# Patient Record
Sex: FEMALE | Race: WHITE | NOT HISPANIC OR LATINO | Employment: PART TIME | URBAN - METROPOLITAN AREA
[De-identification: names, ages, dates, MRNs, and addresses within clinical notes are randomized per-mention and may not be internally consistent; named-entity substitution may affect disease eponyms.]

---

## 2019-06-08 ENCOUNTER — HOSPITAL ENCOUNTER (EMERGENCY)
Facility: HOSPITAL | Age: 49
Discharge: HOME | End: 2019-06-08
Attending: EMERGENCY MEDICINE | Admitting: EMERGENCY MEDICINE

## 2019-06-08 ENCOUNTER — APPOINTMENT (OUTPATIENT)
Dept: CT IMAGING | Facility: HOSPITAL | Age: 49
End: 2019-06-08

## 2019-06-08 VITALS
SYSTOLIC BLOOD PRESSURE: 122 MMHG | HEIGHT: 65 IN | BODY MASS INDEX: 34.16 KG/M2 | TEMPERATURE: 98.1 F | WEIGHT: 205 LBS | HEART RATE: 86 BPM | DIASTOLIC BLOOD PRESSURE: 60 MMHG | OXYGEN SATURATION: 97 % | RESPIRATION RATE: 18 BRPM

## 2019-06-08 DIAGNOSIS — N39.0 URINARY TRACT INFECTION WITH HEMATURIA, SITE UNSPECIFIED: Primary | ICD-10-CM

## 2019-06-08 DIAGNOSIS — R10.9 RIGHT FLANK PAIN: ICD-10-CM

## 2019-06-08 DIAGNOSIS — R31.9 URINARY TRACT INFECTION WITH HEMATURIA, SITE UNSPECIFIED: Primary | ICD-10-CM

## 2019-06-08 LAB
ALBUMIN SERPL-MCNC: 3.6 G/DL (ref 3.5–5.2)
ALBUMIN/GLOB SERPL: 0.9 G/DL
ALP SERPL-CCNC: 101 U/L (ref 39–117)
ALT SERPL W P-5'-P-CCNC: 14 U/L (ref 1–33)
ANION GAP SERPL CALCULATED.3IONS-SCNC: 14 MMOL/L
AST SERPL-CCNC: 16 U/L (ref 1–32)
B-HCG UR QL: NEGATIVE
BACTERIA UR QL AUTO: ABNORMAL /HPF
BASOPHILS # BLD AUTO: 0.04 10*3/MM3 (ref 0–0.2)
BASOPHILS NFR BLD AUTO: 0.2 % (ref 0–1.5)
BILIRUB SERPL-MCNC: 0.5 MG/DL (ref 0.2–1.2)
BILIRUB UR QL STRIP: ABNORMAL
BUN BLD-MCNC: 12 MG/DL (ref 6–20)
BUN/CREAT SERPL: 12.1 (ref 7–25)
CALCIUM SPEC-SCNC: 9.5 MG/DL (ref 8.6–10.5)
CHLORIDE SERPL-SCNC: 100 MMOL/L (ref 98–107)
CLARITY UR: ABNORMAL
CO2 SERPL-SCNC: 22 MMOL/L (ref 22–29)
COLOR UR: ABNORMAL
CREAT BLD-MCNC: 0.99 MG/DL (ref 0.57–1)
D-LACTATE SERPL-SCNC: 0.8 MMOL/L (ref 0.5–2)
DEPRECATED RDW RBC AUTO: 47.3 FL (ref 37–54)
EOSINOPHIL # BLD AUTO: 0.02 10*3/MM3 (ref 0–0.4)
EOSINOPHIL NFR BLD AUTO: 0.1 % (ref 0.3–6.2)
ERYTHROCYTE [DISTWIDTH] IN BLOOD BY AUTOMATED COUNT: 18.7 % (ref 12.3–15.4)
GFR SERPL CREATININE-BSD FRML MDRD: 59 ML/MIN/1.73
GLOBULIN UR ELPH-MCNC: 4.1 GM/DL
GLUCOSE BLD-MCNC: 129 MG/DL (ref 65–99)
GLUCOSE UR STRIP-MCNC: NEGATIVE MG/DL
HCT VFR BLD AUTO: 29.2 % (ref 34–46.6)
HGB BLD-MCNC: 8.6 G/DL (ref 12–15.9)
HGB UR QL STRIP.AUTO: ABNORMAL
HOLD SPECIMEN: NORMAL
HOLD SPECIMEN: NORMAL
HYALINE CASTS UR QL AUTO: ABNORMAL /LPF
IMM GRANULOCYTES # BLD AUTO: 0.07 10*3/MM3 (ref 0–0.05)
IMM GRANULOCYTES NFR BLD AUTO: 0.4 % (ref 0–0.5)
KETONES UR QL STRIP: NEGATIVE
LEUKOCYTE ESTERASE UR QL STRIP.AUTO: ABNORMAL
LIPASE SERPL-CCNC: 17 U/L (ref 13–60)
LYMPHOCYTES # BLD AUTO: 2.35 10*3/MM3 (ref 0.7–3.1)
LYMPHOCYTES NFR BLD AUTO: 14 % (ref 19.6–45.3)
MCH RBC QN AUTO: 21.2 PG (ref 26.6–33)
MCHC RBC AUTO-ENTMCNC: 29.5 G/DL (ref 31.5–35.7)
MCV RBC AUTO: 72.1 FL (ref 79–97)
MONOCYTES # BLD AUTO: 1.42 10*3/MM3 (ref 0.1–0.9)
MONOCYTES NFR BLD AUTO: 8.5 % (ref 5–12)
NEUTROPHILS # BLD AUTO: 12.87 10*3/MM3 (ref 1.7–7)
NEUTROPHILS NFR BLD AUTO: 76.8 % (ref 42.7–76)
NITRITE UR QL STRIP: POSITIVE
NRBC BLD AUTO-RTO: 0 /100 WBC (ref 0–0.2)
PH UR STRIP.AUTO: <=5 [PH] (ref 5–8)
PLATELET # BLD AUTO: 407 10*3/MM3 (ref 140–450)
PMV BLD AUTO: 8.9 FL (ref 6–12)
POTASSIUM BLD-SCNC: 3.9 MMOL/L (ref 3.5–5.2)
PROCALCITONIN SERPL-MCNC: 0.22 NG/ML (ref 0.1–0.25)
PROT SERPL-MCNC: 7.7 G/DL (ref 6–8.5)
PROT UR QL STRIP: ABNORMAL
RBC # BLD AUTO: 4.05 10*6/MM3 (ref 3.77–5.28)
RBC # UR: ABNORMAL /HPF
REF LAB TEST METHOD: ABNORMAL
SODIUM BLD-SCNC: 136 MMOL/L (ref 136–145)
SP GR UR STRIP: 1.02 (ref 1–1.03)
SQUAMOUS #/AREA URNS HPF: ABNORMAL /HPF
UROBILINOGEN UR QL STRIP: ABNORMAL
WBC NRBC COR # BLD: 16.77 10*3/MM3 (ref 3.4–10.8)
WBC UR QL AUTO: ABNORMAL /HPF
WHOLE BLOOD HOLD SPECIMEN: NORMAL
WHOLE BLOOD HOLD SPECIMEN: NORMAL

## 2019-06-08 PROCEDURE — 74176 CT ABD & PELVIS W/O CONTRAST: CPT

## 2019-06-08 PROCEDURE — 80053 COMPREHEN METABOLIC PANEL: CPT | Performed by: EMERGENCY MEDICINE

## 2019-06-08 PROCEDURE — 96361 HYDRATE IV INFUSION ADD-ON: CPT

## 2019-06-08 PROCEDURE — 81001 URINALYSIS AUTO W/SCOPE: CPT | Performed by: EMERGENCY MEDICINE

## 2019-06-08 PROCEDURE — 96365 THER/PROPH/DIAG IV INF INIT: CPT

## 2019-06-08 PROCEDURE — 81025 URINE PREGNANCY TEST: CPT | Performed by: EMERGENCY MEDICINE

## 2019-06-08 PROCEDURE — 25010000002 HYDROMORPHONE PER 4 MG: Performed by: EMERGENCY MEDICINE

## 2019-06-08 PROCEDURE — 83690 ASSAY OF LIPASE: CPT | Performed by: EMERGENCY MEDICINE

## 2019-06-08 PROCEDURE — 96375 TX/PRO/DX INJ NEW DRUG ADDON: CPT

## 2019-06-08 PROCEDURE — 96368 THER/DIAG CONCURRENT INF: CPT

## 2019-06-08 PROCEDURE — 87040 BLOOD CULTURE FOR BACTERIA: CPT | Performed by: PHYSICIAN ASSISTANT

## 2019-06-08 PROCEDURE — 25010000002 PIPERACILLIN SOD-TAZOBACTAM PER 1 G: Performed by: PHYSICIAN ASSISTANT

## 2019-06-08 PROCEDURE — 84145 PROCALCITONIN (PCT): CPT | Performed by: PHYSICIAN ASSISTANT

## 2019-06-08 PROCEDURE — 83605 ASSAY OF LACTIC ACID: CPT | Performed by: EMERGENCY MEDICINE

## 2019-06-08 PROCEDURE — 25010000002 VANCOMYCIN 10 G RECONSTITUTED SOLUTION: Performed by: PHYSICIAN ASSISTANT

## 2019-06-08 PROCEDURE — 25010000002 KETOROLAC TROMETHAMINE PER 15 MG: Performed by: PHYSICIAN ASSISTANT

## 2019-06-08 PROCEDURE — 85025 COMPLETE CBC W/AUTO DIFF WBC: CPT | Performed by: EMERGENCY MEDICINE

## 2019-06-08 PROCEDURE — 25010000002 ONDANSETRON PER 1 MG: Performed by: EMERGENCY MEDICINE

## 2019-06-08 PROCEDURE — 99284 EMERGENCY DEPT VISIT MOD MDM: CPT

## 2019-06-08 RX ORDER — HYDROMORPHONE HYDROCHLORIDE 1 MG/ML
0.5 INJECTION, SOLUTION INTRAMUSCULAR; INTRAVENOUS; SUBCUTANEOUS ONCE
Status: COMPLETED | OUTPATIENT
Start: 2019-06-08 | End: 2019-06-08

## 2019-06-08 RX ORDER — KETOROLAC TROMETHAMINE 15 MG/ML
10 INJECTION, SOLUTION INTRAMUSCULAR; INTRAVENOUS ONCE
Status: COMPLETED | OUTPATIENT
Start: 2019-06-08 | End: 2019-06-08

## 2019-06-08 RX ORDER — CEFDINIR 300 MG/1
300 CAPSULE ORAL 2 TIMES DAILY
Qty: 14 CAPSULE | Refills: 0 | OUTPATIENT
Start: 2019-06-08 | End: 2019-06-11

## 2019-06-08 RX ORDER — ONDANSETRON 4 MG/1
4 TABLET, FILM COATED ORAL EVERY 8 HOURS PRN
Qty: 20 TABLET | Refills: 0 | Status: SHIPPED | OUTPATIENT
Start: 2019-06-08 | End: 2019-08-18

## 2019-06-08 RX ORDER — TRAMADOL HYDROCHLORIDE 50 MG/1
50 TABLET ORAL EVERY 6 HOURS PRN
Qty: 12 TABLET | Refills: 0 | Status: SHIPPED | OUTPATIENT
Start: 2019-06-08 | End: 2019-08-18

## 2019-06-08 RX ORDER — ONDANSETRON 2 MG/ML
4 INJECTION INTRAMUSCULAR; INTRAVENOUS ONCE
Status: COMPLETED | OUTPATIENT
Start: 2019-06-08 | End: 2019-06-08

## 2019-06-08 RX ORDER — SODIUM CHLORIDE 0.9 % (FLUSH) 0.9 %
10 SYRINGE (ML) INJECTION AS NEEDED
Status: DISCONTINUED | OUTPATIENT
Start: 2019-06-08 | End: 2019-06-08 | Stop reason: HOSPADM

## 2019-06-08 RX ADMIN — SODIUM CHLORIDE 1000 ML: 9 INJECTION, SOLUTION INTRAVENOUS at 02:11

## 2019-06-08 RX ADMIN — TAZOBACTAM SODIUM AND PIPERACILLIN SODIUM 3.38 G: 375; 3 INJECTION, SOLUTION INTRAVENOUS at 04:09

## 2019-06-08 RX ADMIN — ONDANSETRON 4 MG: 2 INJECTION INTRAMUSCULAR; INTRAVENOUS at 02:10

## 2019-06-08 RX ADMIN — KETOROLAC TROMETHAMINE 10 MG: 15 INJECTION, SOLUTION INTRAMUSCULAR; INTRAVENOUS at 02:12

## 2019-06-08 RX ADMIN — HYDROMORPHONE HYDROCHLORIDE 0.5 MG: 1 INJECTION, SOLUTION INTRAMUSCULAR; INTRAVENOUS; SUBCUTANEOUS at 02:10

## 2019-06-08 RX ADMIN — SODIUM CHLORIDE 1000 ML: 9 INJECTION, SOLUTION INTRAVENOUS at 04:09

## 2019-06-08 RX ADMIN — VANCOMYCIN HYDROCHLORIDE 1250 MG: 10 INJECTION, POWDER, LYOPHILIZED, FOR SOLUTION INTRAVENOUS at 04:09

## 2019-06-08 NOTE — DISCHARGE INSTRUCTIONS
Increase fluid intake.  Recheck in 2 days with your primary care provider.  Return to the emergency department immediately if any change or worsening of symptoms.

## 2019-06-08 NOTE — ED PROVIDER NOTES
Subjective   49-year-old female presents emergency department planing of sharp right lower quadrant pain radiating into the right flank and right thigh worsening over the past 2 days.  She now has gross hematuria, nausea and anorexia, with chills but no documented fever.  She is currently on her menses.  No prior history of kidney stones.  No prior story of appendectomy.  Last ate around 1900 hrs.  Past medical history is otherwise unremarkable.  No orthostasis, diarrhea, melena or hematochezia, no cough chest congestion sputum or hemoptysis.  No rash stiff neck vision changes or photophobia.            Review of Systems   Constitutional: Positive for activity change, appetite change, chills and fatigue. Negative for fever.   Respiratory: Negative for cough, shortness of breath and wheezing.    Cardiovascular: Negative for chest pain, palpitations and leg swelling.   Gastrointestinal: Positive for abdominal pain and nausea. Negative for vomiting.   Genitourinary: Positive for flank pain, hematuria and pelvic pain. Negative for dysuria, menstrual problem, vaginal bleeding and vaginal discharge.   All other systems reviewed and are negative.      No past medical history on file.    No Known Allergies    No past surgical history on file.    No family history on file.    Social History     Socioeconomic History   • Marital status:      Spouse name: Not on file   • Number of children: Not on file   • Years of education: Not on file   • Highest education level: Not on file   Tobacco Use   • Smoking status: Never Smoker   Substance and Sexual Activity   • Alcohol use: No     Frequency: Never           Objective   Physical Exam   Constitutional: She is oriented to person, place, and time. She appears well-developed and well-nourished. No distress.   Afebrile, hemodynamically stable, appears to be uncomfortable but not in acute distress.   HENT:   Head: Normocephalic and atraumatic.   Right Ear: External ear normal.    Left Ear: External ear normal.   Nose: Nose normal.   Mouth/Throat: Oropharynx is clear and moist. No oropharyngeal exudate.   Eyes: Conjunctivae and EOM are normal. Pupils are equal, round, and reactive to light. Right eye exhibits no discharge. Left eye exhibits no discharge. No scleral icterus.   Neck: Normal range of motion. Neck supple. No JVD present. No tracheal deviation present. No thyromegaly present.   Cardiovascular: Normal rate.   Pulmonary/Chest: Effort normal. No stridor. No respiratory distress.   Abdominal: Soft. Normal appearance and bowel sounds are normal. She exhibits no distension. There is no hepatosplenomegaly. There is tenderness in the right lower quadrant. There is guarding and CVA tenderness. There is no rigidity, no rebound, no tenderness at McBurney's point and negative Love's sign.   Musculoskeletal: Normal range of motion. She exhibits no edema, tenderness or deformity.   Neurological: She is alert and oriented to person, place, and time. No cranial nerve deficit. She exhibits normal muscle tone. Coordination normal.   Skin: Skin is warm and dry. No rash noted. She is not diaphoretic. No erythema. No pallor.   Psychiatric: She has a normal mood and affect. Her behavior is normal. Judgment and thought content normal.   Nursing note and vitals reviewed.      Procedures           ED Course  ED Course as of Jun 08 0448   Sat Jun 08, 2019   0243 WBC: (!) 16.77 [TG]   0243 Neutrophils Absolute: (!) 12.87 [TG]   0243 Hemoglobin: (!) 8.6 [TG]   0244 Hematocrit: (!) 29.2 [TG]   0244 Platelets: 407 [TG]   0256 WBC: (!) 16.77 [TG]   0257 RBC, UA: (!) Too Numerous to Count [TG]   0257 WBC, UA: (!) Too Numerous to Count [TG]   0311 IMPRESSION:  1.  Bulky fibroid uterus with possible degenerating fibroid seen anteriorly within the uterus.  2.  Simple cyst upper pole left kidney and hypodense cyst seen lower pole left kidney. No concerning renal mass.  3.  No renal stone. No hydronephrosis or  hydroureter.  [TG]   0440 Reviewed with Dr. Patino  [TG]   7915 Nitrite, UA: (!) Positive [TG]      ED Course User Index  [TG] Jamaal Celeste PA-C                  MDM      Final diagnoses:   Urinary tract infection with hematuria, site unspecified   Right flank pain            Jamaal Celeste PA-C  06/08/19 0442

## 2019-06-10 ENCOUNTER — NURSE TRIAGE (OUTPATIENT)
Dept: CALL CENTER | Facility: HOSPITAL | Age: 49
End: 2019-06-10

## 2019-06-10 NOTE — TELEPHONE ENCOUNTER
"Advised caller she may take one tylenol 325mg or 500mg with her tramedol for headache and neck pain.     Reason for Disposition  • Caller has medication question, adult has minor symptoms, caller declines triage, and triager answers question    Additional Information  • Negative: Drug overdose and nurse unable to answer question  • Negative: Caller requesting information not related to medicine  • Negative: Caller requesting a prescription for Strep throat and has a positive culture result  • Negative: Rash while taking a medication or within 3 days of stopping it  • Negative: Immunization reaction suspected  • Negative: [1] Asthma and [2] having symptoms of asthma (cough, wheezing, etc)  • Negative: MORE THAN A DOUBLE DOSE of a prescription or over-the-counter (OTC) drug  • Negative: [1] DOUBLE DOSE (an extra dose or lesser amount) of over-the-counter (OTC) drug AND [2] any symptoms (e.g., dizziness, nausea, pain, sleepiness)  • Negative: [1] DOUBLE DOSE (an extra dose or lesser amount) of prescription drug AND [2] any symptoms (e.g., dizziness, nausea, pain, sleepiness)  • Negative: Took another person's prescription drug  • Negative: [1] DOUBLE DOSE (an extra dose or lesser amount) of prescription drug AND [2] NO symptoms (Exception: a double dose of antibiotics)  • Negative: Diabetes drug error or overdose (e.g., insulin or extra dose)  • Negative: [1] Request for URGENT new prescription or refill of \"essential\" medication (i.e., likelihood of harm to patient if not taken) AND [2] triager unable to fill per unit policy  • Negative: [1] Prescription not at pharmacy AND [2] was prescribed today by PCP  • Negative: Pharmacy calling with prescription questions and triager unable to answer question  • Negative: Caller has URGENT medication question about med that PCP prescribed and triager unable to answer question  • Negative: Caller has NON-URGENT medication question about med that PCP prescribed and triager unable " "to answer question  • Negative: Caller requesting a NON-URGENT new prescription or refill and triager unable to refill per unit policy  • Negative: Caller has medication question about med not prescribed by PCP and triager unable to answer question (e.g., compatibility with other med, storage)  • Negative: [1] DOUBLE DOSE (an extra dose or lesser amount) of over-the-counter (OTC) drug AND [2] NO symptoms  • Negative: [1] DOUBLE DOSE (an extra dose or lesser amount) of antibiotic drug AND [2] NO symptoms  • Negative: Caller has medication question only, adult not sick, and triager answers question    Answer Assessment - Initial Assessment Questions  1. SYMPTOMS: \"Do you have any symptoms?\"      Headache and neck pain  2. SEVERITY: If symptoms are present, ask \"Are they mild, moderate or severe?\"      Moderate    Protocols used: MEDICATION QUESTION CALL-ADULT-      "

## 2019-06-11 ENCOUNTER — APPOINTMENT (OUTPATIENT)
Dept: ULTRASOUND IMAGING | Facility: HOSPITAL | Age: 49
End: 2019-06-11

## 2019-06-11 ENCOUNTER — HOSPITAL ENCOUNTER (EMERGENCY)
Facility: HOSPITAL | Age: 49
Discharge: HOME | End: 2019-06-11
Attending: EMERGENCY MEDICINE | Admitting: EMERGENCY MEDICINE

## 2019-06-11 VITALS
DIASTOLIC BLOOD PRESSURE: 68 MMHG | BODY MASS INDEX: 34.16 KG/M2 | HEIGHT: 65 IN | TEMPERATURE: 97.9 F | RESPIRATION RATE: 16 BRPM | WEIGHT: 205 LBS | HEART RATE: 80 BPM | SYSTOLIC BLOOD PRESSURE: 128 MMHG | OXYGEN SATURATION: 94 %

## 2019-06-11 DIAGNOSIS — R10.2 PELVIC PAIN: Primary | ICD-10-CM

## 2019-06-11 DIAGNOSIS — D64.9 ANEMIA, UNSPECIFIED TYPE: ICD-10-CM

## 2019-06-11 DIAGNOSIS — D25.9 UTERINE LEIOMYOMA, UNSPECIFIED LOCATION: ICD-10-CM

## 2019-06-11 DIAGNOSIS — N92.0 MENORRHAGIA WITH REGULAR CYCLE: ICD-10-CM

## 2019-06-11 LAB
ALBUMIN SERPL-MCNC: 3.3 G/DL (ref 3.5–5.2)
ALBUMIN/GLOB SERPL: 0.8 G/DL
ALP SERPL-CCNC: 164 U/L (ref 39–117)
ALT SERPL W P-5'-P-CCNC: 37 U/L (ref 1–33)
ANION GAP SERPL CALCULATED.3IONS-SCNC: 12 MMOL/L
AST SERPL-CCNC: 28 U/L (ref 1–32)
B-HCG UR QL: NEGATIVE
BACTERIA UR QL AUTO: ABNORMAL /HPF
BASOPHILS # BLD AUTO: 0.06 10*3/MM3 (ref 0–0.2)
BASOPHILS NFR BLD AUTO: 0.5 % (ref 0–1.5)
BILIRUB SERPL-MCNC: 0.4 MG/DL (ref 0.2–1.2)
BILIRUB UR QL STRIP: NEGATIVE
BILIRUB UR QL STRIP: NEGATIVE
BUN BLD-MCNC: 10 MG/DL (ref 6–20)
BUN/CREAT SERPL: 10.2 (ref 7–25)
CALCIUM SPEC-SCNC: 9 MG/DL (ref 8.6–10.5)
CHLORIDE SERPL-SCNC: 97 MMOL/L (ref 98–107)
CLARITY UR: ABNORMAL
CLARITY UR: CLEAR
CO2 SERPL-SCNC: 28 MMOL/L (ref 22–29)
COLOR UR: YELLOW
COLOR UR: YELLOW
CREAT BLD-MCNC: 0.98 MG/DL (ref 0.57–1)
D-LACTATE SERPL-SCNC: 0.8 MMOL/L (ref 0.5–2)
DEPRECATED RDW RBC AUTO: 47.3 FL (ref 37–54)
EOSINOPHIL # BLD AUTO: 0.13 10*3/MM3 (ref 0–0.4)
EOSINOPHIL NFR BLD AUTO: 1.1 % (ref 0.3–6.2)
ERYTHROCYTE [DISTWIDTH] IN BLOOD BY AUTOMATED COUNT: 18.6 % (ref 12.3–15.4)
GFR SERPL CREATININE-BSD FRML MDRD: 60 ML/MIN/1.73
GLOBULIN UR ELPH-MCNC: 4.4 GM/DL
GLUCOSE BLD-MCNC: 96 MG/DL (ref 65–99)
GLUCOSE UR STRIP-MCNC: NEGATIVE MG/DL
GLUCOSE UR STRIP-MCNC: NEGATIVE MG/DL
HCT VFR BLD AUTO: 26.6 % (ref 34–46.6)
HGB BLD-MCNC: 7.9 G/DL (ref 12–15.9)
HGB UR QL STRIP.AUTO: ABNORMAL
HGB UR QL STRIP.AUTO: NEGATIVE
HOLD SPECIMEN: NORMAL
HOLD SPECIMEN: NORMAL
HYALINE CASTS UR QL AUTO: ABNORMAL /LPF
IMM GRANULOCYTES # BLD AUTO: 0.06 10*3/MM3 (ref 0–0.05)
IMM GRANULOCYTES NFR BLD AUTO: 0.5 % (ref 0–0.5)
INTERNAL NEGATIVE CONTROL: NEGATIVE
INTERNAL POSITIVE CONTROL: POSITIVE
KETONES UR QL STRIP: NEGATIVE
KETONES UR QL STRIP: NEGATIVE
LEUKOCYTE ESTERASE UR QL STRIP.AUTO: NEGATIVE
LEUKOCYTE ESTERASE UR QL STRIP.AUTO: NEGATIVE
LIPASE SERPL-CCNC: 19 U/L (ref 13–60)
LYMPHOCYTES # BLD AUTO: 1.29 10*3/MM3 (ref 0.7–3.1)
LYMPHOCYTES NFR BLD AUTO: 10.5 % (ref 19.6–45.3)
Lab: NORMAL
MCH RBC QN AUTO: 21.6 PG (ref 26.6–33)
MCHC RBC AUTO-ENTMCNC: 29.7 G/DL (ref 31.5–35.7)
MCV RBC AUTO: 72.7 FL (ref 79–97)
MONOCYTES # BLD AUTO: 1 10*3/MM3 (ref 0.1–0.9)
MONOCYTES NFR BLD AUTO: 8.1 % (ref 5–12)
NEUTROPHILS # BLD AUTO: 9.76 10*3/MM3 (ref 1.7–7)
NEUTROPHILS NFR BLD AUTO: 79.3 % (ref 42.7–76)
NITRITE UR QL STRIP: NEGATIVE
NITRITE UR QL STRIP: NEGATIVE
NRBC BLD AUTO-RTO: 0 /100 WBC (ref 0–0.2)
PH UR STRIP.AUTO: <=5 [PH] (ref 5–8)
PH UR STRIP.AUTO: <=5 [PH] (ref 5–8)
PLATELET # BLD AUTO: 474 10*3/MM3 (ref 140–450)
PMV BLD AUTO: 9.3 FL (ref 6–12)
POTASSIUM BLD-SCNC: 4 MMOL/L (ref 3.5–5.2)
PROT SERPL-MCNC: 7.7 G/DL (ref 6–8.5)
PROT UR QL STRIP: NEGATIVE
PROT UR QL STRIP: NEGATIVE
RBC # BLD AUTO: 3.66 10*6/MM3 (ref 3.77–5.28)
RBC # UR: ABNORMAL /HPF
REF LAB TEST METHOD: ABNORMAL
SODIUM BLD-SCNC: 137 MMOL/L (ref 136–145)
SP GR UR STRIP: 1.01 (ref 1–1.03)
SP GR UR STRIP: 1.01 (ref 1–1.03)
SQUAMOUS #/AREA URNS HPF: ABNORMAL /HPF
UROBILINOGEN UR QL STRIP: ABNORMAL
UROBILINOGEN UR QL STRIP: NORMAL
WBC NRBC COR # BLD: 12.3 10*3/MM3 (ref 3.4–10.8)
WBC UR QL AUTO: ABNORMAL /HPF
WHOLE BLOOD HOLD SPECIMEN: NORMAL
WHOLE BLOOD HOLD SPECIMEN: NORMAL

## 2019-06-11 PROCEDURE — 81003 URINALYSIS AUTO W/O SCOPE: CPT | Performed by: PHYSICIAN ASSISTANT

## 2019-06-11 PROCEDURE — 80053 COMPREHEN METABOLIC PANEL: CPT | Performed by: EMERGENCY MEDICINE

## 2019-06-11 PROCEDURE — 85025 COMPLETE CBC W/AUTO DIFF WBC: CPT | Performed by: EMERGENCY MEDICINE

## 2019-06-11 PROCEDURE — 76830 TRANSVAGINAL US NON-OB: CPT

## 2019-06-11 PROCEDURE — 81001 URINALYSIS AUTO W/SCOPE: CPT | Performed by: EMERGENCY MEDICINE

## 2019-06-11 PROCEDURE — 99284 EMERGENCY DEPT VISIT MOD MDM: CPT

## 2019-06-11 PROCEDURE — 83690 ASSAY OF LIPASE: CPT | Performed by: EMERGENCY MEDICINE

## 2019-06-11 PROCEDURE — P9612 CATHETERIZE FOR URINE SPEC: HCPCS

## 2019-06-11 PROCEDURE — 83605 ASSAY OF LACTIC ACID: CPT | Performed by: PHYSICIAN ASSISTANT

## 2019-06-11 PROCEDURE — 81025 URINE PREGNANCY TEST: CPT | Performed by: EMERGENCY MEDICINE

## 2019-06-11 RX ORDER — SODIUM CHLORIDE 0.9 % (FLUSH) 0.9 %
10 SYRINGE (ML) INJECTION AS NEEDED
Status: DISCONTINUED | OUTPATIENT
Start: 2019-06-11 | End: 2019-06-11 | Stop reason: HOSPADM

## 2019-06-11 RX ORDER — DOXYCYCLINE HYCLATE 50 MG/1
324 CAPSULE, GELATIN COATED ORAL
Qty: 30 TABLET | Refills: 0 | Status: SHIPPED | OUTPATIENT
Start: 2019-06-11

## 2019-06-13 LAB
BACTERIA SPEC AEROBE CULT: NORMAL
BACTERIA SPEC AEROBE CULT: NORMAL

## 2019-07-25 ENCOUNTER — TRANSCRIBE ORDERS (OUTPATIENT)
Dept: ADMINISTRATIVE | Facility: HOSPITAL | Age: 49
End: 2019-07-25

## 2019-07-25 DIAGNOSIS — Z12.31 VISIT FOR SCREENING MAMMOGRAM: Primary | ICD-10-CM

## 2019-08-16 ENCOUNTER — ANESTHESIA EVENT (OUTPATIENT)
Dept: PERIOP | Facility: HOSPITAL | Age: 49
End: 2019-08-16

## 2019-08-16 RX ORDER — SODIUM CHLORIDE 0.9 % (FLUSH) 0.9 %
3 SYRINGE (ML) INJECTION EVERY 12 HOURS SCHEDULED
Status: CANCELLED | OUTPATIENT
Start: 2019-08-16

## 2019-08-16 RX ORDER — FAMOTIDINE 10 MG/ML
20 INJECTION, SOLUTION INTRAVENOUS ONCE
Status: CANCELLED | OUTPATIENT
Start: 2019-08-16 | End: 2019-08-16

## 2019-08-16 RX ORDER — SODIUM CHLORIDE 0.9 % (FLUSH) 0.9 %
3-10 SYRINGE (ML) INJECTION AS NEEDED
Status: CANCELLED | OUTPATIENT
Start: 2019-08-16

## 2019-08-18 ENCOUNTER — APPOINTMENT (OUTPATIENT)
Dept: PREADMISSION TESTING | Facility: HOSPITAL | Age: 49
End: 2019-08-18

## 2019-08-18 VITALS — WEIGHT: 202.82 LBS | BODY MASS INDEX: 33.79 KG/M2 | HEIGHT: 65 IN

## 2019-08-18 LAB
ABO GROUP BLD: NORMAL
ALBUMIN SERPL-MCNC: 4.6 G/DL (ref 3.5–5.2)
ALBUMIN/GLOB SERPL: 1.4 G/DL
ALP SERPL-CCNC: 103 U/L (ref 39–117)
ALT SERPL W P-5'-P-CCNC: 11 U/L (ref 1–33)
ANION GAP SERPL CALCULATED.3IONS-SCNC: 13 MMOL/L (ref 5–15)
AST SERPL-CCNC: 13 U/L (ref 1–32)
BILIRUB SERPL-MCNC: 0.2 MG/DL (ref 0.2–1.2)
BLD GP AB SCN SERPL QL: NEGATIVE
BUN BLD-MCNC: 14 MG/DL (ref 6–20)
BUN/CREAT SERPL: 13.6 (ref 7–25)
CALCIUM SPEC-SCNC: 10.6 MG/DL (ref 8.6–10.5)
CHLORIDE SERPL-SCNC: 102 MMOL/L (ref 98–107)
CO2 SERPL-SCNC: 27 MMOL/L (ref 22–29)
CREAT BLD-MCNC: 1.03 MG/DL (ref 0.57–1)
DEPRECATED RDW RBC AUTO: 55.5 FL (ref 37–54)
ERYTHROCYTE [DISTWIDTH] IN BLOOD BY AUTOMATED COUNT: 21 % (ref 12.3–15.4)
GFR SERPL CREATININE-BSD FRML MDRD: 57 ML/MIN/1.73
GLOBULIN UR ELPH-MCNC: 3.3 GM/DL
GLUCOSE BLD-MCNC: 97 MG/DL (ref 65–99)
HBA1C MFR BLD: 5.5 % (ref 4.8–5.6)
HCG INTACT+B SERPL-ACNC: <0.5 MIU/ML
HCT VFR BLD AUTO: 37.7 % (ref 34–46.6)
HGB BLD-MCNC: 10.9 G/DL (ref 12–15.9)
MCH RBC QN AUTO: 21.6 PG (ref 26.6–33)
MCHC RBC AUTO-ENTMCNC: 28.9 G/DL (ref 31.5–35.7)
MCV RBC AUTO: 74.8 FL (ref 79–97)
PLATELET # BLD AUTO: 483 10*3/MM3 (ref 140–450)
PMV BLD AUTO: 8.8 FL (ref 6–12)
POTASSIUM BLD-SCNC: 4.1 MMOL/L (ref 3.5–5.2)
PROT SERPL-MCNC: 7.9 G/DL (ref 6–8.5)
RBC # BLD AUTO: 5.04 10*6/MM3 (ref 3.77–5.28)
RH BLD: NEGATIVE
SODIUM BLD-SCNC: 142 MMOL/L (ref 136–145)
T&S EXPIRATION DATE: NORMAL
WBC NRBC COR # BLD: 10.43 10*3/MM3 (ref 3.4–10.8)

## 2019-08-18 PROCEDURE — 85027 COMPLETE CBC AUTOMATED: CPT | Performed by: OBSTETRICS & GYNECOLOGY

## 2019-08-18 PROCEDURE — 86901 BLOOD TYPING SEROLOGIC RH(D): CPT | Performed by: OBSTETRICS & GYNECOLOGY

## 2019-08-18 PROCEDURE — 80053 COMPREHEN METABOLIC PANEL: CPT | Performed by: OBSTETRICS & GYNECOLOGY

## 2019-08-18 PROCEDURE — 93005 ELECTROCARDIOGRAM TRACING: CPT

## 2019-08-18 PROCEDURE — 36415 COLL VENOUS BLD VENIPUNCTURE: CPT

## 2019-08-18 PROCEDURE — 86850 RBC ANTIBODY SCREEN: CPT | Performed by: OBSTETRICS & GYNECOLOGY

## 2019-08-18 PROCEDURE — 93010 ELECTROCARDIOGRAM REPORT: CPT | Performed by: INTERNAL MEDICINE

## 2019-08-18 PROCEDURE — 86900 BLOOD TYPING SEROLOGIC ABO: CPT | Performed by: OBSTETRICS & GYNECOLOGY

## 2019-08-18 PROCEDURE — 83036 HEMOGLOBIN GLYCOSYLATED A1C: CPT | Performed by: ANESTHESIOLOGY

## 2019-08-18 PROCEDURE — 84702 CHORIONIC GONADOTROPIN TEST: CPT | Performed by: OBSTETRICS & GYNECOLOGY

## 2019-08-18 NOTE — PAT
Patient to apply Chlorhexadine wipes  to surgical area (as instructed) the night before procedure and the AM of procedure. Wipes provided.    Blood bank bracelet applied to patient's left wrist during Pre Admission Testing visit.  Patient instructed not to remove from arm until after procedure and they are discharged from the hospital.  Explained to patient that they may shower and get the bracelet wet, but not to immerse under water for longer periods (bathing, swimming, hand dishwashing, etc).  Patient verbalized understanding.    Per Anesthesia Request, patient instructed not to take their ACE/ARB medications on the AM of surgery.    Pt. States that consent should say oopherectomy.  Consent not signed in PAT, note added to chart.

## 2019-08-18 NOTE — DISCHARGE INSTRUCTIONS
The following information and instructions were given:    Do not eat, drink, smoke or chew gum after midnight the night before surgery. This also includes no mints.  Take all routine, prescribed medications including heart and blood pressure medicines with a sip of water unless otherwise instructed by your physician.   Do NOT take diabetic medication unless instructed by your physician.    If you were instructed to drink 20 ounces (or until full) of Gatorade the morning of surgery, the Gatorade must be completed 1 hour before arrival time on the day of surgery .  No RED Gatorade.      DO NOT shave for two days before your procedure.  Do not wear makeup.      DO NOT wear fingernail polish (gel/regular) and/or acrylic/artificial nails on the day of surgery.   If you have had a recent manicure and would rather not remove polish or artificial nails, then the minimum requirement is that the polish/artificial nails must be removed from the middle finger on each hand.      If you are having surgery/procedure on an upper extremity, then fingernail polish/artificial fingernails must be removed for surgery.  NO EXCEPTIONS.      If you are having surgery/procedure on a lower extremity, then toenail polish on both extremities must be removed for surgery.  NO EXCEPTIONS.    Remove all jewelry (advise to go to jeweler if unable to remove).  Jewelry, especially rings, can no longer be taped for surgery.    Leave anything you consider valuable at home.    Leave your suitcase in the car until after your surgery.    Bring the following with you the day of your procedure (when applicable)       -picture ID and insurance cards       -Co-pay/deductible required by insurance       -Medications in the original bottles (not a list) including all over-the-counter medications if not brought to PAT       -Copy of advance directive, living will or power of  documents if not brought to PAT       -CPAP or BIPAP mask and tubing (do not  bring machine)       -Skin prep instruction(s) sheet       -PAT Pass    Education booklet, brochure, handout or binder related to procedure given to patient.    When applicable, an ERAS booklet was given to patient.    Pain Control After Surgery handout given to patient.    Respirex use (handout given to patient) and pneumonia prevention education provided.    Signs and Symptoms of infection discussed.    DVT Prevention education given.  Stressing the importance of ambulation.    Please apply Chlorhexadine wipes to surgical area (if instructed) the night before procedure and the AM of procedure and document date/time of applications on skin prep instruction sheet.      \

## 2019-08-19 ENCOUNTER — HOSPITAL ENCOUNTER (INPATIENT)
Facility: HOSPITAL | Age: 49
LOS: 2 days | Discharge: HOME | End: 2019-08-21
Attending: OBSTETRICS & GYNECOLOGY | Admitting: OBSTETRICS & GYNECOLOGY

## 2019-08-19 ENCOUNTER — ANESTHESIA (OUTPATIENT)
Dept: PERIOP | Facility: HOSPITAL | Age: 49
End: 2019-08-19

## 2019-08-19 DIAGNOSIS — D25.1 INTRAMURAL LEIOMYOMA OF UTERUS: ICD-10-CM

## 2019-08-19 DIAGNOSIS — N92.0 MENORRHAGIA: ICD-10-CM

## 2019-08-19 LAB
ABO GROUP BLD: NORMAL
DEPRECATED RDW RBC AUTO: 55.7 FL (ref 37–54)
ERYTHROCYTE [DISTWIDTH] IN BLOOD BY AUTOMATED COUNT: 20.2 % (ref 12.3–15.4)
HCT VFR BLD AUTO: 31.9 % (ref 34–46.6)
HGB BLD-MCNC: 9.3 G/DL (ref 12–15.9)
MCH RBC QN AUTO: 22.2 PG (ref 26.6–33)
MCHC RBC AUTO-ENTMCNC: 29.2 G/DL (ref 31.5–35.7)
MCV RBC AUTO: 76.1 FL (ref 79–97)
PLATELET # BLD AUTO: 388 10*3/MM3 (ref 140–450)
PMV BLD AUTO: 9 FL (ref 6–12)
RBC # BLD AUTO: 4.19 10*6/MM3 (ref 3.77–5.28)
RH BLD: NEGATIVE
WBC NRBC COR # BLD: 15.42 10*3/MM3 (ref 3.4–10.8)

## 2019-08-19 PROCEDURE — C1765 ADHESION BARRIER: HCPCS | Performed by: OBSTETRICS & GYNECOLOGY

## 2019-08-19 PROCEDURE — 25010000003 CEFAZOLIN IN DEXTROSE 2-4 GM/100ML-% SOLUTION: Performed by: OBSTETRICS & GYNECOLOGY

## 2019-08-19 PROCEDURE — 86900 BLOOD TYPING SEROLOGIC ABO: CPT

## 2019-08-19 PROCEDURE — 25010000002 ONDANSETRON PER 1 MG: Performed by: NURSE ANESTHETIST, CERTIFIED REGISTERED

## 2019-08-19 PROCEDURE — 0UT90ZZ RESECTION OF UTERUS, OPEN APPROACH: ICD-10-PCS | Performed by: OBSTETRICS & GYNECOLOGY

## 2019-08-19 PROCEDURE — 0UT20ZZ RESECTION OF BILATERAL OVARIES, OPEN APPROACH: ICD-10-PCS | Performed by: OBSTETRICS & GYNECOLOGY

## 2019-08-19 PROCEDURE — 25010000002 PROMETHAZINE PER 50 MG: Performed by: NURSE ANESTHETIST, CERTIFIED REGISTERED

## 2019-08-19 PROCEDURE — 85027 COMPLETE CBC AUTOMATED: CPT | Performed by: OBSTETRICS & GYNECOLOGY

## 2019-08-19 PROCEDURE — 86901 BLOOD TYPING SEROLOGIC RH(D): CPT

## 2019-08-19 PROCEDURE — 25010000002 BUPRENORPHINE PER 0.1 MG: Performed by: ANESTHESIOLOGY

## 2019-08-19 PROCEDURE — 25010000002 DEXAMETHASONE PER 1 MG: Performed by: NURSE ANESTHETIST, CERTIFIED REGISTERED

## 2019-08-19 PROCEDURE — 25010000002 PROPOFOL 10 MG/ML EMULSION: Performed by: NURSE ANESTHETIST, CERTIFIED REGISTERED

## 2019-08-19 PROCEDURE — 25010000002 HYDROMORPHONE HCL-NACL 30-0.9 MG/30ML-% SOLUTION PREFILLED SYRINGE: Performed by: OBSTETRICS & GYNECOLOGY

## 2019-08-19 PROCEDURE — 0UT70ZZ RESECTION OF BILATERAL FALLOPIAN TUBES, OPEN APPROACH: ICD-10-PCS | Performed by: OBSTETRICS & GYNECOLOGY

## 2019-08-19 PROCEDURE — 25010000002 PHENYLEPHRINE PER 1 ML: Performed by: NURSE ANESTHETIST, CERTIFIED REGISTERED

## 2019-08-19 PROCEDURE — 25010000002 FENTANYL CITRATE (PF) 100 MCG/2ML SOLUTION: Performed by: NURSE ANESTHETIST, CERTIFIED REGISTERED

## 2019-08-19 PROCEDURE — 25010000002 HYDROMORPHONE PER 4 MG: Performed by: NURSE ANESTHETIST, CERTIFIED REGISTERED

## 2019-08-19 PROCEDURE — 25010000002 HEPARIN (PORCINE) PER 1000 UNITS: Performed by: OBSTETRICS & GYNECOLOGY

## 2019-08-19 PROCEDURE — 25010000002 DEXAMETHASONE SODIUM PHOSPHATE 10 MG/ML SOLUTION: Performed by: ANESTHESIOLOGY

## 2019-08-19 PROCEDURE — 88307 TISSUE EXAM BY PATHOLOGIST: CPT | Performed by: OBSTETRICS & GYNECOLOGY

## 2019-08-19 PROCEDURE — 25010000002 KETOROLAC TROMETHAMINE PER 15 MG: Performed by: NURSE ANESTHETIST, CERTIFIED REGISTERED

## 2019-08-19 RX ORDER — CEFAZOLIN SODIUM 2 G/100ML
2 INJECTION, SOLUTION INTRAVENOUS ONCE
Status: COMPLETED | OUTPATIENT
Start: 2019-08-19 | End: 2019-08-19

## 2019-08-19 RX ORDER — PROMETHAZINE HYDROCHLORIDE 25 MG/1
25 TABLET ORAL ONCE AS NEEDED
Status: COMPLETED | OUTPATIENT
Start: 2019-08-19 | End: 2019-08-19

## 2019-08-19 RX ORDER — ESTRADIOL 0.05 MG/D
1 PATCH TRANSDERMAL WEEKLY
Status: DISCONTINUED | OUTPATIENT
Start: 2019-08-19 | End: 2019-08-21 | Stop reason: HOSPADM

## 2019-08-19 RX ORDER — KETOROLAC TROMETHAMINE 30 MG/ML
INJECTION, SOLUTION INTRAMUSCULAR; INTRAVENOUS AS NEEDED
Status: DISCONTINUED | OUTPATIENT
Start: 2019-08-19 | End: 2019-08-19 | Stop reason: SURG

## 2019-08-19 RX ORDER — LIDOCAINE HYDROCHLORIDE 10 MG/ML
0.5 INJECTION, SOLUTION EPIDURAL; INFILTRATION; INTRACAUDAL; PERINEURAL ONCE AS NEEDED
Status: COMPLETED | OUTPATIENT
Start: 2019-08-19 | End: 2019-08-19

## 2019-08-19 RX ORDER — PROPOFOL 10 MG/ML
VIAL (ML) INTRAVENOUS AS NEEDED
Status: DISCONTINUED | OUTPATIENT
Start: 2019-08-19 | End: 2019-08-19 | Stop reason: SURG

## 2019-08-19 RX ORDER — FENTANYL CITRATE 50 UG/ML
50 INJECTION, SOLUTION INTRAMUSCULAR; INTRAVENOUS
Status: DISCONTINUED | OUTPATIENT
Start: 2019-08-19 | End: 2019-08-19 | Stop reason: HOSPADM

## 2019-08-19 RX ORDER — IBUPROFEN 600 MG/1
600 TABLET ORAL EVERY 6 HOURS PRN
Status: DISCONTINUED | OUTPATIENT
Start: 2019-08-19 | End: 2019-08-21 | Stop reason: HOSPADM

## 2019-08-19 RX ORDER — BUPIVACAINE HYDROCHLORIDE 2.5 MG/ML
INJECTION, SOLUTION EPIDURAL; INFILTRATION; INTRACAUDAL
Status: COMPLETED | OUTPATIENT
Start: 2019-08-19 | End: 2019-08-19

## 2019-08-19 RX ORDER — BUPRENORPHINE HYDROCHLORIDE 0.32 MG/ML
INJECTION INTRAMUSCULAR; INTRAVENOUS
Status: COMPLETED | OUTPATIENT
Start: 2019-08-19 | End: 2019-08-19

## 2019-08-19 RX ORDER — GLYCOPYRROLATE 0.2 MG/ML
INJECTION INTRAMUSCULAR; INTRAVENOUS AS NEEDED
Status: DISCONTINUED | OUTPATIENT
Start: 2019-08-19 | End: 2019-08-19 | Stop reason: SURG

## 2019-08-19 RX ORDER — PROMETHAZINE HYDROCHLORIDE 25 MG/ML
12.5 INJECTION, SOLUTION INTRAMUSCULAR; INTRAVENOUS ONCE AS NEEDED
Status: COMPLETED | OUTPATIENT
Start: 2019-08-19 | End: 2019-08-19

## 2019-08-19 RX ORDER — FAMOTIDINE 20 MG/1
20 TABLET, FILM COATED ORAL ONCE
Status: COMPLETED | OUTPATIENT
Start: 2019-08-19 | End: 2019-08-19

## 2019-08-19 RX ORDER — LABETALOL HYDROCHLORIDE 5 MG/ML
5 INJECTION, SOLUTION INTRAVENOUS
Status: DISCONTINUED | OUTPATIENT
Start: 2019-08-19 | End: 2019-08-19 | Stop reason: HOSPADM

## 2019-08-19 RX ORDER — FENTANYL CITRATE 50 UG/ML
INJECTION, SOLUTION INTRAMUSCULAR; INTRAVENOUS AS NEEDED
Status: DISCONTINUED | OUTPATIENT
Start: 2019-08-19 | End: 2019-08-19 | Stop reason: SURG

## 2019-08-19 RX ORDER — ATRACURIUM BESYLATE 10 MG/ML
INJECTION, SOLUTION INTRAVENOUS AS NEEDED
Status: DISCONTINUED | OUTPATIENT
Start: 2019-08-19 | End: 2019-08-19 | Stop reason: SURG

## 2019-08-19 RX ORDER — DEXAMETHASONE SODIUM PHOSPHATE 10 MG/ML
INJECTION, SOLUTION INTRAMUSCULAR; INTRAVENOUS
Status: COMPLETED | OUTPATIENT
Start: 2019-08-19 | End: 2019-08-19

## 2019-08-19 RX ORDER — ROPINIROLE 0.5 MG/1
1 TABLET, FILM COATED ORAL 4 TIMES DAILY PRN
Status: DISCONTINUED | OUTPATIENT
Start: 2019-08-19 | End: 2019-08-21 | Stop reason: HOSPADM

## 2019-08-19 RX ORDER — NALOXONE HCL 0.4 MG/ML
0.1 VIAL (ML) INJECTION
Status: DISCONTINUED | OUTPATIENT
Start: 2019-08-19 | End: 2019-08-20

## 2019-08-19 RX ORDER — HYDROMORPHONE HYDROCHLORIDE 1 MG/ML
0.5 INJECTION, SOLUTION INTRAMUSCULAR; INTRAVENOUS; SUBCUTANEOUS
Status: DISCONTINUED | OUTPATIENT
Start: 2019-08-19 | End: 2019-08-19 | Stop reason: HOSPADM

## 2019-08-19 RX ORDER — DOCUSATE SODIUM 100 MG/1
100 CAPSULE, LIQUID FILLED ORAL 2 TIMES DAILY PRN
Status: DISCONTINUED | OUTPATIENT
Start: 2019-08-19 | End: 2019-08-21 | Stop reason: HOSPADM

## 2019-08-19 RX ORDER — MAGNESIUM HYDROXIDE 1200 MG/15ML
LIQUID ORAL AS NEEDED
Status: DISCONTINUED | OUTPATIENT
Start: 2019-08-19 | End: 2019-08-19 | Stop reason: HOSPADM

## 2019-08-19 RX ORDER — ONDANSETRON 4 MG/1
4 TABLET, FILM COATED ORAL EVERY 6 HOURS PRN
Status: DISCONTINUED | OUTPATIENT
Start: 2019-08-19 | End: 2019-08-21 | Stop reason: HOSPADM

## 2019-08-19 RX ORDER — CEFAZOLIN SODIUM 2 G/100ML
2 INJECTION, SOLUTION INTRAVENOUS EVERY 8 HOURS
Status: COMPLETED | OUTPATIENT
Start: 2019-08-19 | End: 2019-08-20

## 2019-08-19 RX ORDER — ONDANSETRON 2 MG/ML
4 INJECTION INTRAMUSCULAR; INTRAVENOUS EVERY 6 HOURS PRN
Status: DISCONTINUED | OUTPATIENT
Start: 2019-08-19 | End: 2019-08-21 | Stop reason: HOSPADM

## 2019-08-19 RX ORDER — PROMETHAZINE HYDROCHLORIDE 25 MG/1
25 SUPPOSITORY RECTAL ONCE AS NEEDED
Status: COMPLETED | OUTPATIENT
Start: 2019-08-19 | End: 2019-08-19

## 2019-08-19 RX ORDER — SODIUM CHLORIDE 9 MG/ML
125 INJECTION, SOLUTION INTRAVENOUS CONTINUOUS
Status: DISCONTINUED | OUTPATIENT
Start: 2019-08-19 | End: 2019-08-21 | Stop reason: HOSPADM

## 2019-08-19 RX ORDER — SIMETHICONE 80 MG
80 TABLET,CHEWABLE ORAL 4 TIMES DAILY PRN
Status: DISCONTINUED | OUTPATIENT
Start: 2019-08-19 | End: 2019-08-21 | Stop reason: HOSPADM

## 2019-08-19 RX ORDER — HYDROCODONE BITARTRATE AND ACETAMINOPHEN 5; 325 MG/1; MG/1
1 TABLET ORAL EVERY 4 HOURS PRN
Status: DISCONTINUED | OUTPATIENT
Start: 2019-08-19 | End: 2019-08-21 | Stop reason: HOSPADM

## 2019-08-19 RX ORDER — SODIUM CHLORIDE, SODIUM LACTATE, POTASSIUM CHLORIDE, CALCIUM CHLORIDE 600; 310; 30; 20 MG/100ML; MG/100ML; MG/100ML; MG/100ML
9 INJECTION, SOLUTION INTRAVENOUS CONTINUOUS
Status: DISCONTINUED | OUTPATIENT
Start: 2019-08-19 | End: 2019-08-19

## 2019-08-19 RX ORDER — LIDOCAINE HYDROCHLORIDE 10 MG/ML
INJECTION, SOLUTION EPIDURAL; INFILTRATION; INTRACAUDAL; PERINEURAL AS NEEDED
Status: DISCONTINUED | OUTPATIENT
Start: 2019-08-19 | End: 2019-08-19 | Stop reason: SURG

## 2019-08-19 RX ORDER — LISINOPRIL 10 MG/1
10 TABLET ORAL DAILY
Status: DISCONTINUED | OUTPATIENT
Start: 2019-08-19 | End: 2019-08-21 | Stop reason: HOSPADM

## 2019-08-19 RX ORDER — DEXAMETHASONE SODIUM PHOSPHATE 4 MG/ML
INJECTION, SOLUTION INTRA-ARTICULAR; INTRALESIONAL; INTRAMUSCULAR; INTRAVENOUS; SOFT TISSUE AS NEEDED
Status: DISCONTINUED | OUTPATIENT
Start: 2019-08-19 | End: 2019-08-19 | Stop reason: SURG

## 2019-08-19 RX ORDER — ONDANSETRON 2 MG/ML
INJECTION INTRAMUSCULAR; INTRAVENOUS AS NEEDED
Status: DISCONTINUED | OUTPATIENT
Start: 2019-08-19 | End: 2019-08-19 | Stop reason: SURG

## 2019-08-19 RX ORDER — OXYCODONE AND ACETAMINOPHEN 10; 325 MG/1; MG/1
1 TABLET ORAL EVERY 4 HOURS PRN
Status: DISCONTINUED | OUTPATIENT
Start: 2019-08-19 | End: 2019-08-21 | Stop reason: HOSPADM

## 2019-08-19 RX ORDER — HEPARIN SODIUM 5000 [USP'U]/ML
5000 INJECTION, SOLUTION INTRAVENOUS; SUBCUTANEOUS EVERY 8 HOURS SCHEDULED
Status: DISCONTINUED | OUTPATIENT
Start: 2019-08-19 | End: 2019-08-21 | Stop reason: HOSPADM

## 2019-08-19 RX ADMIN — HYDROMORPHONE HYDROCHLORIDE 0.5 MG: 1 INJECTION, SOLUTION INTRAMUSCULAR; INTRAVENOUS; SUBCUTANEOUS at 13:30

## 2019-08-19 RX ADMIN — ATRACURIUM BESYLATE 50 MG: 10 INJECTION, SOLUTION INTRAVENOUS at 10:52

## 2019-08-19 RX ADMIN — FENTANYL CITRATE 150 MCG: 50 INJECTION, SOLUTION INTRAMUSCULAR; INTRAVENOUS at 10:52

## 2019-08-19 RX ADMIN — BUPIVACAINE HYDROCHLORIDE 5 ML: 2.5 INJECTION, SOLUTION EPIDURAL; INFILTRATION; INTRACAUDAL; PERINEURAL at 10:55

## 2019-08-19 RX ADMIN — FENTANYL CITRATE 50 MCG: 0.05 INJECTION, SOLUTION INTRAMUSCULAR; INTRAVENOUS at 13:51

## 2019-08-19 RX ADMIN — ATRACURIUM BESYLATE 10 MG: 10 INJECTION, SOLUTION INTRAVENOUS at 12:40

## 2019-08-19 RX ADMIN — OXYCODONE HYDROCHLORIDE AND ACETAMINOPHEN 1 TABLET: 10; 325 TABLET ORAL at 18:19

## 2019-08-19 RX ADMIN — LISINOPRIL 10 MG: 10 TABLET ORAL at 17:15

## 2019-08-19 RX ADMIN — HYDROMORPHONE HYDROCHLORIDE 0.5 MG: 1 INJECTION, SOLUTION INTRAMUSCULAR; INTRAVENOUS; SUBCUTANEOUS at 13:37

## 2019-08-19 RX ADMIN — FENTANYL CITRATE 50 MCG: 50 INJECTION, SOLUTION INTRAMUSCULAR; INTRAVENOUS at 11:16

## 2019-08-19 RX ADMIN — LIDOCAINE HYDROCHLORIDE 0.2 ML: 10 INJECTION, SOLUTION EPIDURAL; INFILTRATION; INTRACAUDAL; PERINEURAL at 08:50

## 2019-08-19 RX ADMIN — FENTANYL CITRATE 50 MCG: 0.05 INJECTION, SOLUTION INTRAMUSCULAR; INTRAVENOUS at 13:59

## 2019-08-19 RX ADMIN — PROPOFOL 200 MG: 10 INJECTION, EMULSION INTRAVENOUS at 10:52

## 2019-08-19 RX ADMIN — CEFAZOLIN SODIUM 2 G: 2 INJECTION, SOLUTION INTRAVENOUS at 18:42

## 2019-08-19 RX ADMIN — ONDANSETRON 4 MG: 2 INJECTION INTRAMUSCULAR; INTRAVENOUS at 12:30

## 2019-08-19 RX ADMIN — KETOROLAC TROMETHAMINE 30 MG: 30 INJECTION, SOLUTION INTRAMUSCULAR at 12:54

## 2019-08-19 RX ADMIN — PROMETHAZINE HYDROCHLORIDE 12.5 MG: 25 INJECTION INTRAMUSCULAR; INTRAVENOUS at 14:02

## 2019-08-19 RX ADMIN — SODIUM CHLORIDE, POTASSIUM CHLORIDE, SODIUM LACTATE AND CALCIUM CHLORIDE 9 ML/HR: 600; 310; 30; 20 INJECTION, SOLUTION INTRAVENOUS at 08:50

## 2019-08-19 RX ADMIN — ROPINIROLE 1 MG: 0.5 TABLET, FILM COATED ORAL at 23:07

## 2019-08-19 RX ADMIN — DEXAMETHASONE SODIUM PHOSPHATE 2 MG: 10 INJECTION INTRAMUSCULAR; INTRAVENOUS at 10:55

## 2019-08-19 RX ADMIN — IBUPROFEN 600 MG: 600 TABLET, FILM COATED ORAL at 22:22

## 2019-08-19 RX ADMIN — CEFAZOLIN SODIUM 2 G: 2 INJECTION, SOLUTION INTRAVENOUS at 11:00

## 2019-08-19 RX ADMIN — DEXAMETHASONE SODIUM PHOSPHATE 4 MG: 4 INJECTION, SOLUTION INTRAMUSCULAR; INTRAVENOUS at 10:52

## 2019-08-19 RX ADMIN — OXYCODONE HYDROCHLORIDE AND ACETAMINOPHEN 1 TABLET: 10; 325 TABLET ORAL at 22:22

## 2019-08-19 RX ADMIN — FENTANYL CITRATE 50 MCG: 50 INJECTION, SOLUTION INTRAMUSCULAR; INTRAVENOUS at 12:43

## 2019-08-19 RX ADMIN — PHENYLEPHRINE HYDROCHLORIDE 100 MCG: 10 INJECTION INTRAVENOUS at 11:30

## 2019-08-19 RX ADMIN — FAMOTIDINE 20 MG: 20 TABLET ORAL at 08:50

## 2019-08-19 RX ADMIN — LIDOCAINE HYDROCHLORIDE 60 MG: 10 INJECTION, SOLUTION EPIDURAL; INFILTRATION; INTRACAUDAL; PERINEURAL at 10:52

## 2019-08-19 RX ADMIN — BUPRENORPHINE HYDROCHLORIDE 0.3 MG: 0.32 INJECTION INTRAMUSCULAR; INTRAVENOUS at 10:55

## 2019-08-19 RX ADMIN — ESTRADIOL 1 PATCH: 0.05 PATCH TRANSDERMAL at 17:15

## 2019-08-19 RX ADMIN — GLYCOPYRROLATE 0.2 MG: 0.2 INJECTION, SOLUTION INTRAMUSCULAR; INTRAVENOUS at 11:30

## 2019-08-19 RX ADMIN — HEPARIN SODIUM 5000 UNITS: 5000 INJECTION INTRAVENOUS; SUBCUTANEOUS at 22:22

## 2019-08-19 RX ADMIN — SODIUM CHLORIDE, POTASSIUM CHLORIDE, SODIUM LACTATE AND CALCIUM CHLORIDE: 600; 310; 30; 20 INJECTION, SOLUTION INTRAVENOUS at 12:10

## 2019-08-19 RX ADMIN — Medication: at 13:40

## 2019-08-19 RX ADMIN — ONDANSETRON HYDROCHLORIDE 4 MG: 4 TABLET, FILM COATED ORAL at 18:19

## 2019-08-19 NOTE — ANESTHESIA POSTPROCEDURE EVALUATION
Patient: Rebekah Sung    Procedure Summary     Date:  08/19/19 Room / Location:   [facility] / [facility]    Anesthesia Start:  1046 Anesthesia Stop:  1322    Procedure:  TOTAL ABDOMINAL HYSTERECTOMY BILATERAL SALPINGECTOMY (N/A Abdomen) Diagnosis:      Surgeon:  Nadja Madrid MD Provider:  Kemar Arvizu MD    Anesthesia Type:  general ASA Status:  2          Anesthesia Type: general  Last vitals  BP   130/66 (08/19/19 1322)   Temp   97.1 °F (36.2 °C) (08/19/19 1322)   Pulse   78 (08/19/19 1322)   Resp   16 (08/19/19 1322)     SpO2   94 % (08/19/19 1322)     Post Anesthesia Care and Evaluation    Patient location during evaluation: PACU  Patient participation: complete - patient participated  Level of consciousness: awake and alert  Pain management: adequate  Airway patency: patent  Anesthetic complications: No anesthetic complications  PONV Status: none  Cardiovascular status: acceptable  Respiratory status: acceptable  Hydration status: acceptable

## 2019-08-19 NOTE — ANESTHESIA PROCEDURE NOTES
Peripheral Block      Patient reassessed immediately prior to procedure    Patient location during procedure: OR  Reason for block: at surgeon's request and post-op pain management  Preanesthetic Checklist  Completed: patient identified, site marked, surgical consent, pre-op evaluation, timeout performed, IV checked, risks and benefits discussed and monitors and equipment checked  Prep:  Pt Position: supine  Sterile barriers:cap, gloves, sterile barriers and mask  Prep: ChloraPrep  Patient monitoring: blood pressure monitoring, continuous pulse oximetry and EKG  Procedure  Sedation:yes  Performed under: general  Guidance:ultrasound guided  Images:still images obtained, printed/placed on chart    Laterality:Bilateral  Block Type:TAP  Injection Technique:single-shot  Needle Type:short-bevel and echogenic  Needle Gauge:20 G  Resistance on Injection: none    Medications Used: bupivacaine PF (MARCAINE) 0.25 % injection, 5 mL  dexamethasone sodium phosphate injection, 2 mg  buprenorphine (BUPRENEX) injection, 0.3 mg  Med admintered at 8/19/2019 10:55 AM      Medications  Comment:Block Injection:  LA dose divided between Right and Left block        Post Assessment  Injection Assessment: negative aspiration for heme, incremental injection and no paresthesia on injection  Patient Tolerance:comfortable throughout block  Complications:no  Additional Notes      Under Ultrasound guidance, a BBraun 4inch 360 degree needle was advanced with Normal Saline hydro dissection of tissue.  The Internal Oblique and Transversus Abdominus muscles where visualized.  At or before the aponeurosis of Internal Oblique, local anesthetic spread was visualized in the Transversus Abdominus Plane. Injection was made incrementally with aspiration every 5 mls.  There was no  intravascular injection,  injection pressure was normal, there was no neural injection, and the procedure was completed without difficulty.  Thank You.

## 2019-08-19 NOTE — OP NOTE
TOTAL ABDOMINAL HYSTERECTOMY BILATERAL SALPINGO OOPHORECTOMY    Procedure Note    Pre-op Diagnosis:   Uterine fibroids  Dysmenorrhea    Post-op Diagnosis:     Uterine fibroids  Dysmenorrhea    Procedure(s):  TOTAL ABDOMINAL HYSTERECTOMY BILATERAL SALPINGO OOPHORECTOMY    Surgeons:  MD Ismael Johnson MD Sarah Fausel, MD    Anesthesia: General with TAP Block    Estimated Blood Loss: 100 mL    Specimen:  Uterus, bilateral tubes and ovaries    Drains:   Urethral Catheter 16 Fr. (Active)       Findings: fibroid uterus, normal bilateral tubes and ovaries    Complications: none    Description of Procedure:  After consent was obtained, the patient was taken to  the operating room and underwent general endotracheal anesthesia. The patient  was prepped and draped in a normal sterile fashion in the supine position. Positioning was found to be adequate. The abdomen, perineum and vagina were prepped and draped in the usual sterile fashion. A pfannenstiel skin incision was created. This was carried down to fascia sharply. Fascial incision was made with scalpel and extended laterally with the curved mayos. Peritoneal incision was made using curved Metzenbaum scissors. The peritoneal opening was extended using the Bovie. The O'Gan-O'Saul retractor was inserted into abdomen with care not to incorporate bowel. Palpation of uterus revealed multiple large fibroids. The uterus was unable to be exteriorized. A Jean Marie clamp was used to grasp a large anterior fibroid. A scalpel was used to incise the serosa. Casas-Heck scissors were used to shell out the fibroid. This process was repeat on a second large fibroid with minimal blood loss. Attention was turned to the left side. The ureter was easily palpated and noted to be distant from the surgical site. The IP was clamp using curved Haneys x 2 and scissors were used to transect it. The remaining pedical was tied and sutured with good hemostasis. Next the round ligament  was transected and sutured. Attention was then turned to the patient's right side. Because of limited exposure, the right uterine-ovarian vessels were clamped and ligated with plan to return for the ovary was uterus was removed. The right round ligament was also transected and sutured. Next the bladder flap was carefully developed. Uterine vessels were isolated from surrounding structures. Bilateral uterine vessels were clamped with striaght Kiet's and ligated with 0 chromic. Good hemostasis was noted. Bilateral ureters were again palpated. The cervix was palpated. A curved Kiet clamp was placed across the vagina and the specimen was cut from vagina. The specimen was handed off to be sent to pathology. The vaginal cuff was closed in a running locking stitch using 0 chromic that incorporated bilateral apexes.  The abdomen was copiously irrigated. Next the right ovary was removed by clamping and tying off the IP. There was a small amount of bleeding noted at the vaginal cuff. A 3-0 vicryl suture was used to achieve hemostasis. All pedicles were visualized and found to be hemostatic. Again, the ureters were palpated and also visualized at this time. The abdomen was copiously irrigated. #1 PDS suture was used to close the fascia. The subcutaneous tissue was again copiously irrigated and then reapproximated with 3-0 vicryl with several buried interrupted sutures. The skin was closed using staples. Counts were correct x 3. Dr. Madrid was present and scrubbed for the entirety of the procedure.

## 2019-08-19 NOTE — ANESTHESIA PREPROCEDURE EVALUATION
Anesthesia Evaluation                  Airway   Mallampati: I  TM distance: >3 FB  Neck ROM: full  No difficulty expected  Dental      Pulmonary    Cardiovascular     ECG reviewed    (+) hypertension,       Neuro/Psych  GI/Hepatic/Renal/Endo      Musculoskeletal     Abdominal    Substance History      OB/GYN          Other                        Anesthesia Plan    ASA 2     general     intravenous induction   Anesthetic plan, all risks, benefits, and alternatives have been provided, discussed and informed consent has been obtained with: patient.    Plan discussed with CRNA.

## 2019-08-19 NOTE — ANESTHESIA PROCEDURE NOTES
Airway  Urgency: elective    Airway not difficult    General Information and Staff    Patient location during procedure: OR    Indications and Patient Condition  Indications for airway management: airway protection    Preoxygenated: yes  MILS not maintained throughout  Mask difficulty assessment: 1 - vent by mask    Final Airway Details  Final airway type: endotracheal airway      Successful airway: ETT  Cuffed: yes   Successful intubation technique: direct laryngoscopy  Endotracheal tube insertion site: oral  Blade: Duran  Blade size: 3  ETT size (mm): 6.5  Cormack-Lehane Classification: grade I - full view of glottis  Placement verified by: chest auscultation and capnometry   Number of attempts at approach: 1

## 2019-08-20 LAB
ANION GAP SERPL CALCULATED.3IONS-SCNC: 10 MMOL/L (ref 5–15)
BASOPHILS # BLD AUTO: 0.03 10*3/MM3 (ref 0–0.2)
BASOPHILS NFR BLD AUTO: 0.2 % (ref 0–1.5)
BUN BLD-MCNC: 16 MG/DL (ref 6–20)
BUN/CREAT SERPL: 15.4 (ref 7–25)
CALCIUM SPEC-SCNC: 8.4 MG/DL (ref 8.6–10.5)
CHLORIDE SERPL-SCNC: 105 MMOL/L (ref 98–107)
CO2 SERPL-SCNC: 22 MMOL/L (ref 22–29)
CREAT BLD-MCNC: 1.04 MG/DL (ref 0.57–1)
CYTO UR: NORMAL
DEPRECATED RDW RBC AUTO: 55.2 FL (ref 37–54)
EOSINOPHIL # BLD AUTO: 0.05 10*3/MM3 (ref 0–0.4)
EOSINOPHIL NFR BLD AUTO: 0.3 % (ref 0.3–6.2)
ERYTHROCYTE [DISTWIDTH] IN BLOOD BY AUTOMATED COUNT: 20.3 % (ref 12.3–15.4)
GFR SERPL CREATININE-BSD FRML MDRD: 56 ML/MIN/1.73
GLUCOSE BLD-MCNC: 174 MG/DL (ref 65–99)
HCT VFR BLD AUTO: 29 % (ref 34–46.6)
HGB BLD-MCNC: 8.5 G/DL (ref 12–15.9)
IMM GRANULOCYTES # BLD AUTO: 0.09 10*3/MM3 (ref 0–0.05)
IMM GRANULOCYTES NFR BLD AUTO: 0.6 % (ref 0–0.5)
LAB AP CASE REPORT: NORMAL
LAB AP CLINICAL INFORMATION: NORMAL
LYMPHOCYTES # BLD AUTO: 0.86 10*3/MM3 (ref 0.7–3.1)
LYMPHOCYTES NFR BLD AUTO: 6 % (ref 19.6–45.3)
MCH RBC QN AUTO: 22.1 PG (ref 26.6–33)
MCHC RBC AUTO-ENTMCNC: 29.3 G/DL (ref 31.5–35.7)
MCV RBC AUTO: 75.3 FL (ref 79–97)
MONOCYTES # BLD AUTO: 0.84 10*3/MM3 (ref 0.1–0.9)
MONOCYTES NFR BLD AUTO: 5.8 % (ref 5–12)
NEUTROPHILS # BLD AUTO: 12.58 10*3/MM3 (ref 1.7–7)
NEUTROPHILS NFR BLD AUTO: 87.1 % (ref 42.7–76)
NRBC BLD AUTO-RTO: 0 /100 WBC (ref 0–0.2)
PATH REPORT.FINAL DX SPEC: NORMAL
PATH REPORT.GROSS SPEC: NORMAL
PLATELET # BLD AUTO: 385 10*3/MM3 (ref 140–450)
PMV BLD AUTO: 9.3 FL (ref 6–12)
POTASSIUM BLD-SCNC: 4.4 MMOL/L (ref 3.5–5.2)
RBC # BLD AUTO: 3.85 10*6/MM3 (ref 3.77–5.28)
SODIUM BLD-SCNC: 137 MMOL/L (ref 136–145)
WBC NRBC COR # BLD: 14.45 10*3/MM3 (ref 3.4–10.8)

## 2019-08-20 PROCEDURE — 80048 BASIC METABOLIC PNL TOTAL CA: CPT | Performed by: OBSTETRICS & GYNECOLOGY

## 2019-08-20 PROCEDURE — 25010000003 CEFAZOLIN IN DEXTROSE 2-4 GM/100ML-% SOLUTION: Performed by: OBSTETRICS & GYNECOLOGY

## 2019-08-20 PROCEDURE — 85025 COMPLETE CBC W/AUTO DIFF WBC: CPT | Performed by: OBSTETRICS & GYNECOLOGY

## 2019-08-20 PROCEDURE — 25010000002 HEPARIN (PORCINE) PER 1000 UNITS: Performed by: OBSTETRICS & GYNECOLOGY

## 2019-08-20 RX ADMIN — LISINOPRIL 10 MG: 10 TABLET ORAL at 08:58

## 2019-08-20 RX ADMIN — HEPARIN SODIUM 5000 UNITS: 5000 INJECTION INTRAVENOUS; SUBCUTANEOUS at 21:01

## 2019-08-20 RX ADMIN — HEPARIN SODIUM 5000 UNITS: 5000 INJECTION INTRAVENOUS; SUBCUTANEOUS at 15:23

## 2019-08-20 RX ADMIN — OXYCODONE HYDROCHLORIDE AND ACETAMINOPHEN 1 TABLET: 10; 325 TABLET ORAL at 15:25

## 2019-08-20 RX ADMIN — CEFAZOLIN SODIUM 2 G: 2 INJECTION, SOLUTION INTRAVENOUS at 02:40

## 2019-08-20 RX ADMIN — OXYCODONE HYDROCHLORIDE AND ACETAMINOPHEN 1 TABLET: 10; 325 TABLET ORAL at 10:33

## 2019-08-20 RX ADMIN — OXYCODONE HYDROCHLORIDE AND ACETAMINOPHEN 1 TABLET: 10; 325 TABLET ORAL at 02:44

## 2019-08-20 RX ADMIN — OXYCODONE HYDROCHLORIDE AND ACETAMINOPHEN 1 TABLET: 10; 325 TABLET ORAL at 06:44

## 2019-08-20 RX ADMIN — IBUPROFEN 600 MG: 600 TABLET, FILM COATED ORAL at 06:44

## 2019-08-20 RX ADMIN — SODIUM CHLORIDE 125 ML/HR: 9 INJECTION, SOLUTION INTRAVENOUS at 02:40

## 2019-08-20 RX ADMIN — HEPARIN SODIUM 5000 UNITS: 5000 INJECTION INTRAVENOUS; SUBCUTANEOUS at 06:44

## 2019-08-20 NOTE — PLAN OF CARE
Problem: Patient Care Overview  Goal: Plan of Care Review  Outcome: Ongoing (interventions implemented as appropriate)   08/20/19 7432   Coping/Psychosocial   Plan of Care Reviewed With patient;spouse   Plan of Care Review   Progress improving   OTHER   Outcome Summary PCA d/c prior to shift starting. Pain tolerable with po pain meds. Adequate UOP this shift. Foleyto be discontinued before shift is over. Horizontal dressing C/D/I.

## 2019-08-20 NOTE — PLAN OF CARE
Problem: Patient Care Overview  Goal: Plan of Care Review  Outcome: Ongoing (interventions implemented as appropriate)   08/20/19 0115   Coping/Psychosocial   Plan of Care Reviewed With patient;family   OTHER   Outcome Summary vss. pt alert and oriented. two prns of percocet today for pain. will continue to monitor.     Goal: Individualization and Mutuality  Outcome: Ongoing (interventions implemented as appropriate)    Goal: Discharge Needs Assessment  Outcome: Ongoing (interventions implemented as appropriate)    Goal: Interprofessional Rounds/Family Conf  Outcome: Ongoing (interventions implemented as appropriate)      Problem: Hysterectomy (Adult)  Goal: Signs and Symptoms of Listed Potential Problems Will be Absent, Minimized or Managed (Hysterectomy)  Outcome: Ongoing (interventions implemented as appropriate)    Goal: Anesthesia/Sedation Recovery  Outcome: Ongoing (interventions implemented as appropriate)

## 2019-08-20 NOTE — PROGRESS NOTES
Discharge Planning Assessment  [facility]     Patient Name: Rebekah Sung  MRN: [id number]  Today's Date: 8/20/2019    Admit Date: 8/19/2019    Discharge Needs Assessment     Row Name 08/20/19 1038       Living Environment    Lives With  spouse    Name(s) of Who Lives With Patient  Cosmo is spouse,     Current Living Arrangements  home/apartment/condo    Primary Care Provided by  self    Provides Primary Care For  no one    Family Caregiver if Needed  spouse;parent(s)    Family Caregiver Names  Plans to stay with Ladonna tabares    Quality of Family Relationships  supportive;involved    Able to Return to Prior Arrangements  yes       Resource/Environmental Concerns    Resource/Environmental Concerns  none    Transportation Concerns  car, none       Transition Planning    Patient/Family Anticipates Transition to  home with family    Patient/Family Anticipated Services at Transition  none    Transportation Anticipated  family or friend will provide       Discharge Needs Assessment    Readmission Within the Last 30 Days  no previous admission in last 30 days    Concerns to be Addressed  no discharge needs identified    Equipment Currently Used at Home  none    Anticipated Changes Related to Illness  none    Equipment Needed After Discharge  none        Discharge Plan     Row Name 08/20/19 1043       Plan    Plan  Home with family    Patient/Family in Agreement with Plan  yes    Plan Comments  I spoke with patient this am. No discharge planning needs identified.     Final Discharge Disposition Code  01 - home or self-care        Destination      No service coordination in this encounter.      Durable Medical Equipment      No service coordination in this encounter.      Dialysis/Infusion      No service coordination in this encounter.      Home Medical Care      No service coordination in this encounter.      Therapy      No service coordination in this encounter.      Community Resources      No service  coordination in this encounter.          Demographic Summary     Row Name 08/20/19 1035       General Information    Admission Type  inpatient    Referral Source  admission list    Preferred Language  English    General Information Comments  PCP is [facility] Colon       Contact Information    Permission Granted to Share Info With      Contact Information Comments  [phone]        Functional Status     Row Name 08/20/19 1036       Functional Status    Usual Activity Tolerance  excellent    Current Activity Tolerance  excellent       Functional Status, IADL    Medications  independent    Meal Preparation  independent    Housekeeping  independent    Laundry  independent    Shopping  independent       Employment/    Employment/ Comments  Has [employer], no concerns voiced with coverage        Psychosocial    No documentation.       Abuse/Neglect    No documentation.       Legal    No documentation.       Substance Abuse    No documentation.       Patient Forms    No documentation.           Liberty Goodson RN

## 2019-08-21 VITALS
RESPIRATION RATE: 16 BRPM | HEART RATE: 67 BPM | BODY MASS INDEX: 34.63 KG/M2 | TEMPERATURE: 98 F | HEIGHT: 64 IN | DIASTOLIC BLOOD PRESSURE: 62 MMHG | SYSTOLIC BLOOD PRESSURE: 127 MMHG | OXYGEN SATURATION: 94 % | WEIGHT: 202.82 LBS

## 2019-08-21 PROCEDURE — 25010000002 HEPARIN (PORCINE) PER 1000 UNITS: Performed by: OBSTETRICS & GYNECOLOGY

## 2019-08-21 RX ORDER — IBUPROFEN 600 MG/1
600 TABLET ORAL EVERY 6 HOURS PRN
Qty: 30 TABLET | Refills: 1 | Status: SHIPPED | OUTPATIENT
Start: 2019-08-21 | End: 2019-08-22

## 2019-08-21 RX ORDER — HYDROCODONE BITARTRATE AND ACETAMINOPHEN 5; 325 MG/1; MG/1
1 TABLET ORAL EVERY 4 HOURS PRN
Qty: 20 TABLET | Refills: 0 | Status: SHIPPED | OUTPATIENT
Start: 2019-08-21 | End: 2019-08-29

## 2019-08-21 RX ORDER — ESTRADIOL 0.05 MG/D
1 PATCH TRANSDERMAL WEEKLY
Qty: 4 PATCH | Refills: 11 | Status: SHIPPED | OUTPATIENT
Start: 2019-08-26 | End: 2019-08-22

## 2019-08-21 RX ADMIN — HEPARIN SODIUM 5000 UNITS: 5000 INJECTION INTRAVENOUS; SUBCUTANEOUS at 06:10

## 2019-08-21 RX ADMIN — OXYCODONE HYDROCHLORIDE AND ACETAMINOPHEN 1 TABLET: 10; 325 TABLET ORAL at 07:53

## 2019-08-21 RX ADMIN — LISINOPRIL 10 MG: 10 TABLET ORAL at 08:00

## 2019-08-21 RX ADMIN — OXYCODONE HYDROCHLORIDE AND ACETAMINOPHEN 1 TABLET: 10; 325 TABLET ORAL at 00:06

## 2019-08-21 NOTE — PROGRESS NOTES
2019    Name:Rebekah Sung   MR#:[id number]      GYN Progress Note       HD:2    Subjective   49 y.o. on POD#2 after CARLITA/BSO for uterine fibroids and menorrhagia. She is doing well today. Ambulating without difficulty, passing flatus, tolerating po, and voiding spontaneously. She feels ready to go home.     Patient Active Problem List    Diagnosis   • Menorrhagia [N92.0]       Objective     Vital Signs Range for the last 24 hours  Temp: Temp:  [97.7 °F (36.5 °C)-98.5 °F (36.9 °C)] 98 °F (36.7 °C) Temp src: Temporal  BP: BP: ()/(53-66) 127/62   Resp:  Resp:  [16-18] 16    I/O last 3 completed shifts:  In: 800 [P.O.:800]  Out: 2950 [Urine:2950]  No intake/output data recorded.      Results from last 7 days   Lab Units 19  0324 19  2040 19  1354   WBC 10*3/mm3 14.45* 15.42* 10.43   HEMOGLOBIN g/dL 8.5* 9.3* 10.9*   HEMATOCRIT % 29.0* 31.9* 37.7   PLATELETS 10*3/mm3 385 388 483*     Results from last 7 days   Lab Units 19  0324 19  1354   SODIUM mmol/L 137 142   POTASSIUM mmol/L 4.4 4.1   CHLORIDE mmol/L 105 102   CO2 mmol/L 22.0 27.0   BUN mg/dL 16 14   CREATININE mg/dL 1.04* 1.03*   CALCIUM mg/dL 8.4* 10.6*   BILIRUBIN mg/dL  --  0.2   ALK PHOS U/L  --  103   ALT (SGPT) U/L  --  11   AST (SGOT) U/L  --  13   GLUCOSE mg/dL 174* 97     Results from last 7 days   Lab Units 19  0324   SODIUM mmol/L 137   POTASSIUM mmol/L 4.4   CHLORIDE mmol/L 105   CO2 mmol/L 22.0   BUN mg/dL 16   CREATININE mg/dL 1.04*   GLUCOSE mg/dL 174*   CALCIUM mg/dL 8.4*         Physical Exam:  General Appearance:  awake, alert, oriented, in no acute distress  Head/face:  NCAT  Lungs:  Normal expansion.  Clear to auscultation.  No rales, rhonchi, or wheezing.  Heart:  Heart sounds are normal.  Regular rate and rhythm without murmur, gallop or rub.  Abdomen:  Skin: dressing is in place, clean and dry  Auscultation: active bowel sounds  Palpation: soft, non-tender  Extremities: NTTP, no edema,  SCDS bilaterally      Assessment/Plan   48 yo F on POD #2 after CARLITA/BSO for uterine fibroids and menorrhagia.      1. Post-op care  - cont routine care  - enc ambulation  - po pain control  - tolerating po, cont regular diet  - cont estradiol patch 0.05mg/24hrs once weekly     2. Hypertension  - cont home lisinopril     3. Restless leg syndrome  - cont home requip     4. Prophylaxis  - cont SQH and pepcid     Dispo: meeting goals for discharge, follow-up with Dr. Madrid for post-op check in 2 weeks     Liberty Schmitz MD, PGY-2

## 2019-08-21 NOTE — PLAN OF CARE
Problem: Patient Care Overview  Goal: Plan of Care Review  Outcome: Ongoing (interventions implemented as appropriate)   08/20/19 0504 08/20/19 2000 08/21/19 0132   Coping/Psychosocial   Plan of Care Reviewed With --  patient --    Plan of Care Review   Progress improving --  --    OTHER   Outcome Summary --  --  vss, A/O, 1x prn pain med given, slept off and on this shift spouse at bedside     Goal: Individualization and Mutuality  Outcome: Ongoing (interventions implemented as appropriate)    Goal: Discharge Needs Assessment  Outcome: Ongoing (interventions implemented as appropriate)    Goal: Interprofessional Rounds/Family Conf  Outcome: Ongoing (interventions implemented as appropriate)      Problem: Hysterectomy (Adult)  Goal: Signs and Symptoms of Listed Potential Problems Will be Absent, Minimized or Managed (Hysterectomy)  Outcome: Ongoing (interventions implemented as appropriate)    Goal: Anesthesia/Sedation Recovery  Outcome: Ongoing (interventions implemented as appropriate)

## 2019-08-21 NOTE — DISCHARGE SUMMARY
[facility]  Rebekah Sung  : 1970  MRN: [id number]  CSN: [id number]    Discharge Summary      Date of Admission: 2019   Date of Discharge:    Discharge Diagnosis: 1. Menorrhagia  2.  Leiomyomata   Procedures Performed: Procedure(s):  TOTAL ABDOMINAL HYSTERECTOMY BILATERAL SALPINGOOOPHORECTOMY          Brief History: Patient is a 49 y.o.who presented with symptomatic large fibroids and menorrhgia desiring definitive therapy.   Hospital Course: Uncomplicated CARLITA/BSO and postoperative course.       Condition at discharge: stable   Discharge Medications:    Your medication list      START taking these medications      Instructions Last Dose Given Next Dose Due   estradiol 0.05 MG/24HR patch  Commonly known as:  CLIMARA  Start taking on:  2019      Place 1 patch on the skin as directed by provider 1 (One) Time Per Week.       HYDROcodone-acetaminophen 5-325 MG per tablet  Commonly known as:  NORCO      Take 1 tablet by mouth Every 4 (Four) Hours As Needed for Moderate Pain  for up to 8 days.       ibuprofen 600 MG tablet  Commonly known as:  ADVIL,MOTRIN      Take 1 tablet by mouth Every 6 (Six) Hours As Needed for Mild Pain .          CONTINUE taking these medications      Instructions Last Dose Given Next Dose Due   lisinopril 10 MG tablet  Commonly known as:  PRINIVIL,ZESTRIL      Take 10 mg by mouth Daily.       rOPINIRole 1 MG tablet  Commonly known as:  REQUIP      Take 1 mg by mouth 4 (Four) Times a Day As Needed. Take 1 hour before bedtime.          ASK your doctor about these medications      Instructions Last Dose Given Next Dose Due   ferrous gluconate 324 MG tablet  Commonly known as:  FERGON      Take 1 tablet by mouth Daily With Breakfast.             Where to Get Your Medications      These medications were sent to [facility] - [place] [id number]    Hours:  7:00 AM-5:30 PM M-F, 8:00 AM-4:30 PM Sat-Sun Phone:  [phone]  ·   estradiol 0.05 MG/24HR patch  · ibuprofen 600 MG tablet     You can get these medications from any pharmacy    Bring a paper prescription for each of these medications  · HYDROcodone-acetaminophen 5-325 MG per tablet        Discharge Disposition: home   Follow-up: Julius in 2 weeks         This note has been electronically signed.    Nadja Madrid MD  August 21, 2019

## 2019-08-21 NOTE — PLAN OF CARE
Problem: Patient Care Overview  Goal: Plan of Care Review  Outcome: Ongoing (interventions implemented as appropriate)   08/21/19 1313   Coping/Psychosocial   Plan of Care Reviewed With patient;family   OTHER   Outcome Summary VSS. a/o. pt has met criteria for dc. staples removed, steri strips applied. to dc home todayl     Goal: Individualization and Mutuality  Outcome: Ongoing (interventions implemented as appropriate)    Goal: Discharge Needs Assessment  Outcome: Ongoing (interventions implemented as appropriate)    Goal: Interprofessional Rounds/Family Conf  Outcome: Ongoing (interventions implemented as appropriate)      Problem: Hysterectomy (Adult)  Goal: Signs and Symptoms of Listed Potential Problems Will be Absent, Minimized or Managed (Hysterectomy)  Outcome: Outcome(s) achieved Date Met: 08/21/19    Goal: Anesthesia/Sedation Recovery  Outcome: Outcome(s) achieved Date Met: 08/21/19

## 2019-08-22 ENCOUNTER — READMISSION MANAGEMENT (OUTPATIENT)
Dept: CALL CENTER | Facility: HOSPITAL | Age: 49
End: 2019-08-22

## 2019-08-22 RX ORDER — ESTRADIOL 0.05 MG/D
1 PATCH TRANSDERMAL WEEKLY
Qty: 4 PATCH | Refills: 10 | Status: SHIPPED | OUTPATIENT
Start: 2019-08-26

## 2019-08-22 RX ORDER — IBUPROFEN 600 MG/1
600 TABLET ORAL EVERY 6 HOURS PRN
Qty: 30 TABLET | Refills: 0 | Status: SHIPPED | OUTPATIENT
Start: 2019-08-22

## 2019-08-23 ENCOUNTER — READMISSION MANAGEMENT (OUTPATIENT)
Dept: CALL CENTER | Facility: HOSPITAL | Age: 49
End: 2019-08-23

## 2019-08-23 NOTE — OUTREACH NOTE
General Surgery Week 1 Survey      Responses   Facility patient discharged from?  [place]   Does the patient have one of the following disease processes/diagnoses(primary or secondary)?  General Surgery   Is there a successful TCM telephone encounter documented?  No   Week 1 attempt successful?  Yes   Call start time  0738   Call end time  0749   Discharge diagnosis  TOTAL ABDOMINAL HYSTERECTOMY BILATERAL SALPINGOOOPHORECTOMY   Is patient permission given to speak with other caregiver?  No   Meds reviewed with patient/caregiver?  Yes   Is the patient having any side effects they believe may be caused by any medication additions or changes?  No   Does the patient have all medications related to this admission filled (includes all antibiotics, pain medications, etc.)  Yes   Is the patient taking all medications as directed (includes completed medication regime)?  Yes   Does the patient have a follow up appointment scheduled with their surgeon?  Yes   Has the patient kept scheduled appointments due by today?  N/A   Comments  She has both of her office visits scheduled. 09/5/19 for both.   Has home health visited the patient within 72 hours of discharge?  N/A   Psychosocial issues?  No   Did the patient receive a copy of their discharge instructions?  Yes   What is the patient's perception of their health status since discharge?  Improving   Nursing interventions  Nurse provided patient education   Is the patient /caregiver able to teach back basic post-op care?  Continue use of incentive spirometry at least 1 week post discharge, Practice 'cough and deep breath', Drive as instructed by MD in discharge instructions, Take showers only when approved by MD-sponge bathe until then, No tub bath, swimming, or hot tub until instructed by MD, Keep incision areas clean,dry and protected, Do not remove steri-strips, Lifting as instructed by MD in discharge instructions   Is the patient/caregiver able to teach back signs and  symptoms of incisional infection?  Increased redness, swelling or pain at the incisonal site, Increased drainage or bleeding, Incisional warmth, Pus or odor from incision, Fever   Is the patient/caregiver able to teach back steps to recovery at home?  Set small, achievable goals for return to baseline health, Rest and rebuild strength, gradually increase activity, Weigh daily, Practice good oral hygiene, Eat a well-balance diet, Make a list of questions for surgeon's appointment   Is the patient/caregiver able to teach back the hierarchy of who to call/visit for symptoms/problems? PCP, Specialist, Home health nurse, Urgent Care, ED, 911  Yes   Week 1 call completed?  Yes   Revoked  No further contact(revokes)-requires comment [She is doing very well. ]          Hailey Abernathy RN

## 2019-08-23 NOTE — OUTREACH NOTE
Prep Survey      Responses   Facility patient discharged from?  [place]   Is patient eligible?  Yes   Discharge diagnosis  TOTAL ABDOMINAL HYSTERECTOMY BILATERAL SALPINGOOOPHORECTOMY   Does the patient have one of the following disease processes/diagnoses(primary or secondary)?  General Surgery   Does the patient have Home health ordered?  No   Is there a DME ordered?  No   Prep survey completed?  Yes          Teressa Hamlin RN

## 2020-08-27 ENCOUNTER — TRANSCRIBE ORDERS (OUTPATIENT)
Dept: MAMMOGRAPHY | Facility: HOSPITAL | Age: 50
End: 2020-08-27

## 2020-08-27 DIAGNOSIS — Z12.31 VISIT FOR SCREENING MAMMOGRAM: Primary | ICD-10-CM

## 2020-09-14 ENCOUNTER — HOSPITAL ENCOUNTER (OUTPATIENT)
Dept: MAMMOGRAPHY | Facility: HOSPITAL | Age: 50
Discharge: HOME | End: 2020-09-14
Admitting: OBSTETRICS & GYNECOLOGY

## 2020-09-14 DIAGNOSIS — Z12.31 VISIT FOR SCREENING MAMMOGRAM: ICD-10-CM

## 2020-09-14 PROCEDURE — 77067 SCR MAMMO BI INCL CAD: CPT

## 2020-09-14 PROCEDURE — 77063 BREAST TOMOSYNTHESIS BI: CPT | Performed by: RADIOLOGY

## 2020-09-14 PROCEDURE — 77067 SCR MAMMO BI INCL CAD: CPT | Performed by: RADIOLOGY

## 2020-09-14 PROCEDURE — 77063 BREAST TOMOSYNTHESIS BI: CPT

## 2024-09-19 NOTE — PROGRESS NOTES
2019    Name:Rebekah Sung   MR#:[id number]      GYN Progress Note       HD:1    Subjective   49 y.o.  on POD#1 after CARLITA/BSO for uterine fibroids and menorrhagia. She is doing well this morning. She is tolerating po intake without nausea/vomiting. Her pain is better controlled since switching from the PCA to PO meds. She has been up out of bed without issues. Her Felix came out this morning but she has not yet voided.    Patient Active Problem List    Diagnosis   • Menorrhagia [N92.0]       Objective     Vital Signs Range for the last 24 hours  Temp: Temp:  [97.1 °F (36.2 °C)-98.8 °F (37.1 °C)] 98 °F (36.7 °C) Temp src: Oral  BP: BP: ()/(50-90) 93/50     I/O last 3 completed shifts:  In: 1000 [I.V.:1000]  Out: 1625 [Urine:1475; Blood:150]  No intake/output data recorded.      Results from last 7 days   Lab Units 19  0324 19  2040 19  1354   WBC 10*3/mm3 14.45* 15.42* 10.43   HEMOGLOBIN g/dL 8.5* 9.3* 10.9*   HEMATOCRIT % 29.0* 31.9* 37.7   PLATELETS 10*3/mm3 385 388 483*     Results from last 7 days   Lab Units 19  0324 19  1354   SODIUM mmol/L 137 142   POTASSIUM mmol/L 4.4 4.1   CHLORIDE mmol/L 105 102   CO2 mmol/L 22.0 27.0   BUN mg/dL 16 14   CREATININE mg/dL 1.04* 1.03*   CALCIUM mg/dL 8.4* 10.6*   BILIRUBIN mg/dL  --  0.2   ALK PHOS U/L  --  103   ALT (SGPT) U/L  --  11   AST (SGOT) U/L  --  13   GLUCOSE mg/dL 174* 97     Results from last 7 days   Lab Units 19  0324   SODIUM mmol/L 137   POTASSIUM mmol/L 4.4   CHLORIDE mmol/L 105   CO2 mmol/L 22.0   BUN mg/dL 16   CREATININE mg/dL 1.04*   GLUCOSE mg/dL 174*   CALCIUM mg/dL 8.4*         Physical Exam:  General Appearance:  awake, alert, oriented, in no acute distress  Head/face:  NCAT  Lungs:  Normal expansion.  Clear to auscultation.  No rales, rhonchi, or wheezing.  Heart:  Heart sounds are normal.  Regular rate and rhythm without murmur, gallop or rub.  Abdomen:  Soft, non-tender, non-distended,  WOC follow up for left nare.     NG tube now not present, area has resolved.    Will sign off.   dressing is clean and dry  Extremities: NTTP, no edema, SCDS bilaterally      Assessment/Plan   50 yo F on POD #1 after CARLITA/BSO for uterine fibroids and menorrhagia.     1. Post-op care  - cont routine care  - enc ambulation  - po pain control  - Felix out this morning - has not yet voided  - tolerating po, cont regular diet  - cont estradiol patch 0.05mg/24hrs once weekly    2. Hypertension  - cont home lisinopril    3. Restless leg syndrome  - cont home requip    4. Prophylaxis  - cont SQH and pepcid    Dispo: continue inpatient management until POD#2    Liberty Schmitz MD, PGY-2

## (undated) DEVICE — DRAPE,T,LAPARO,TRANS,STERILE: Brand: MEDLINE

## (undated) DEVICE — SUT GUT CHRM 1 STD TIES 54IN S115H

## (undated) DEVICE — COVER,LIGHT HANDLE,FLX,1/PK: Brand: MEDLINE INDUSTRIES, INC.

## (undated) DEVICE — GLV SURG SENSICARE MICRO PF LF 6.5 STRL

## (undated) DEVICE — DRSNG WND GZ PAD BORDERED 4X8IN STRL

## (undated) DEVICE — TRY SKINPREP DRYPREP

## (undated) DEVICE — DRSNG WND BORDR/ADHS NONADHR/GZ LF 4X10IN STRL

## (undated) DEVICE — LEX BASIC NO DRAPE: Brand: MEDLINE INDUSTRIES, INC.

## (undated) DEVICE — GLV SURG SENSICARE MICRO PF LF 6 STRL

## (undated) DEVICE — GLV SURG SENSICARE MICRO PF LF 8 STRL

## (undated) DEVICE — ANTIBACTERIAL UNDYED BRAIDED (POLYGLACTIN 910), SYNTHETIC ABSORBABLE SUTURE: Brand: COATED VICRYL

## (undated) DEVICE — SCRB SURG BACTOSHIELD CHG 4PCT 4OZ

## (undated) DEVICE — SUT PDS 0 CTX 36IN VIO PDP370T

## (undated) DEVICE — GLV SURG SENSICARE MICRO PF LF 7.5 STRL

## (undated) DEVICE — SUT PLAIN  3/0 CT1 27IN 842H

## (undated) DEVICE — GLV SURG SIGNATURE TOUCH PF LTX 6 STRL

## (undated) DEVICE — APPL CHLORAPREP W/ALC 26ML CLR

## (undated) DEVICE — SUT GUT CHRM 1 MO4 CR8 18IN CC01G